# Patient Record
Sex: FEMALE | URBAN - METROPOLITAN AREA
[De-identification: names, ages, dates, MRNs, and addresses within clinical notes are randomized per-mention and may not be internally consistent; named-entity substitution may affect disease eponyms.]

---

## 2020-08-07 ENCOUNTER — APPOINTMENT (RX ONLY)
Dept: URBAN - METROPOLITAN AREA CLINIC 52 | Facility: CLINIC | Age: 30
Setting detail: DERMATOLOGY
End: 2020-08-07

## 2020-08-07 VITALS — TEMPERATURE: 98.7 F

## 2020-08-07 DIAGNOSIS — L0390 CELLULITIS AND ABSCESS OF UNSPECIFIED SITES: ICD-10-CM

## 2020-08-07 DIAGNOSIS — D17 BENIGN LIPOMATOUS NEOPLASM: ICD-10-CM

## 2020-08-07 DIAGNOSIS — D22 MELANOCYTIC NEVI: ICD-10-CM

## 2020-08-07 DIAGNOSIS — L0391 CELLULITIS AND ABSCESS OF UNSPECIFIED SITES: ICD-10-CM

## 2020-08-07 PROBLEM — D17.30 BENIGN LIPOMATOUS NEOPLASM OF SKIN AND SUBCUTANEOUS TISSUE OF UNSPECIFIED SITES: Status: ACTIVE | Noted: 2020-08-07

## 2020-08-07 PROBLEM — L02.91 CUTANEOUS ABSCESS, UNSPECIFIED: Status: ACTIVE | Noted: 2020-08-07

## 2020-08-07 PROBLEM — D22.9 MELANOCYTIC NEVI, UNSPECIFIED: Status: ACTIVE | Noted: 2020-08-07

## 2020-08-07 PROCEDURE — 99202 OFFICE O/P NEW SF 15 MIN: CPT

## 2020-08-07 PROCEDURE — ? COUNSELING

## 2020-08-07 PROCEDURE — ? ORDER ULTRASOUND

## 2020-08-07 PROCEDURE — ? ADDITIONAL NOTES

## 2020-08-07 PROCEDURE — ? PRESCRIPTION

## 2020-08-07 RX ORDER — DOXYCYCLINE HYCLATE 100 MG/1
1 CAPSULE, GELATIN COATED ORAL BID
Qty: 20 | Refills: 0 | Status: ERX

## 2020-08-07 ASSESSMENT — LOCATION DETAILED DESCRIPTION DERM: LOCATION DETAILED: HAIR

## 2020-08-07 ASSESSMENT — LOCATION SIMPLE DESCRIPTION DERM: LOCATION SIMPLE: HAIR

## 2020-08-07 ASSESSMENT — LOCATION ZONE DERM: LOCATION ZONE: SCALP

## 2020-08-07 NOTE — PROCEDURE: ORDER ULTRASOUND
Priority: normal
Lesion Location: right inner thigh
Provider: MARK Matthews
Detail Level: Simple
Ultrasound Protocol: Ultrasound of Subcutaneous Mass

## 2021-12-03 ENCOUNTER — APPOINTMENT (RX ONLY)
Dept: URBAN - METROPOLITAN AREA CLINIC 52 | Facility: CLINIC | Age: 31
Setting detail: DERMATOLOGY
End: 2021-12-03

## 2021-12-03 DIAGNOSIS — D22 MELANOCYTIC NEVI: ICD-10-CM

## 2021-12-03 DIAGNOSIS — L0390 CELLULITIS AND ABSCESS OF UNSPECIFIED SITES: ICD-10-CM | Status: INADEQUATELY CONTROLLED

## 2021-12-03 DIAGNOSIS — L0391 CELLULITIS AND ABSCESS OF UNSPECIFIED SITES: ICD-10-CM | Status: INADEQUATELY CONTROLLED

## 2021-12-03 PROBLEM — L02.415 CUTANEOUS ABSCESS OF RIGHT LOWER LIMB: Status: ACTIVE | Noted: 2021-12-03

## 2021-12-03 PROBLEM — D22.4 MELANOCYTIC NEVI OF SCALP AND NECK: Status: ACTIVE | Noted: 2021-12-03

## 2021-12-03 PROCEDURE — 99214 OFFICE O/P EST MOD 30 MIN: CPT

## 2021-12-03 PROCEDURE — ? FULL BODY SKIN EXAM - DECLINED

## 2021-12-03 PROCEDURE — ? COUNSELING

## 2021-12-03 PROCEDURE — ? ADDITIONAL NOTES

## 2021-12-03 PROCEDURE — ? PRESCRIPTION

## 2021-12-03 PROCEDURE — ? RECOMMENDATIONS

## 2021-12-03 RX ORDER — DOXYCYCLINE HYCLATE 100 MG/1
1 CAPSULE, GELATIN COATED ORAL BID
Qty: 20 | Refills: 0 | Status: ERX | COMMUNITY
Start: 2021-12-03

## 2021-12-03 RX ADMIN — DOXYCYCLINE HYCLATE 1: 100 CAPSULE, GELATIN COATED ORAL at 00:00

## 2021-12-03 ASSESSMENT — LOCATION SIMPLE DESCRIPTION DERM
LOCATION SIMPLE: RIGHT THIGH
LOCATION SIMPLE: POSTERIOR SCALP

## 2021-12-03 ASSESSMENT — LOCATION ZONE DERM
LOCATION ZONE: SCALP
LOCATION ZONE: LEG

## 2021-12-03 ASSESSMENT — LOCATION DETAILED DESCRIPTION DERM
LOCATION DETAILED: RIGHT ANTERIOR PROXIMAL THIGH
LOCATION DETAILED: LEFT OCCIPITAL SCALP

## 2021-12-03 NOTE — PROCEDURE: ADDITIONAL NOTES
Detail Level: Simple
Additional Notes: Patient consent was obtained to proceed with the visit and recommended plan of care after discussion of all risks and benefits, including the risks of COVID-19 exposure.
Additional Notes: Refer to general surgeon if seeking removal.
Render Risk Assessment In Note?: no

## 2021-12-03 NOTE — PROCEDURE: RECOMMENDATIONS
Render Risk Assessment In Note?: no
Detail Level: Zone
Recommendations (Free Text): General surgeon for removal
Recommendation Preamble: The following recommendations were made during the visit: